# Patient Record
Sex: MALE | Race: WHITE | Employment: FULL TIME | URBAN - METROPOLITAN AREA
[De-identification: names, ages, dates, MRNs, and addresses within clinical notes are randomized per-mention and may not be internally consistent; named-entity substitution may affect disease eponyms.]

---

## 2017-07-24 ENCOUNTER — HOSPITAL ENCOUNTER (OUTPATIENT)
Age: 57
Discharge: HOME | End: 2017-07-24
Attending: EMERGENCY MEDICINE
Payer: COMMERCIAL

## 2017-07-24 VITALS
BODY MASS INDEX: 28 KG/M2 | TEMPERATURE: 99 F | DIASTOLIC BLOOD PRESSURE: 80 MMHG | SYSTOLIC BLOOD PRESSURE: 143 MMHG | RESPIRATION RATE: 16 BRPM | HEART RATE: 75 BPM | OXYGEN SATURATION: 97 % | WEIGHT: 175 LBS

## 2017-07-24 DIAGNOSIS — H81.10 VERTIGO, BENIGN POSITIONAL, UNSPECIFIED LATERALITY: ICD-10-CM

## 2017-07-24 DIAGNOSIS — J01.90 ACUTE NON-RECURRENT SINUSITIS, UNSPECIFIED LOCATION: Primary | ICD-10-CM

## 2017-07-24 LAB
ATRIAL RATE: 61 BPM
P AXIS: 29 DEGREES
P-R INTERVAL: 160 MS
Q-T INTERVAL: 390 MS
QRS DURATION: 110 MS
QTC CALCULATION (BEZET): 392 MS
R AXIS: -47 DEGREES
T AXIS: 2 DEGREES
VENTRICULAR RATE: 61 BPM

## 2017-07-24 PROCEDURE — 99214 OFFICE O/P EST MOD 30 MIN: CPT

## 2017-07-24 PROCEDURE — 93010 ELECTROCARDIOGRAM REPORT: CPT

## 2017-07-24 PROCEDURE — 93005 ELECTROCARDIOGRAM TRACING: CPT

## 2017-07-24 RX ORDER — METHYLPREDNISOLONE 4 MG/1
TABLET ORAL
Qty: 1 PACKAGE | Refills: 0 | Status: SHIPPED | OUTPATIENT
Start: 2017-07-24

## 2017-07-24 RX ORDER — OMEPRAZOLE 20 MG/1
20 CAPSULE, DELAYED RELEASE ORAL
COMMUNITY

## 2017-07-24 RX ORDER — AMOXICILLIN 875 MG/1
875 TABLET, COATED ORAL 2 TIMES DAILY
Qty: 20 TABLET | Refills: 0 | Status: SHIPPED | OUTPATIENT
Start: 2017-07-24 | End: 2017-08-03

## 2017-07-24 NOTE — ED PROVIDER NOTES
Patient presents with:  Dizziness (neurologic)    HPI:     Chantal Seen is a 60year old male who presents with chief complaint of dizziness, sinus congestion. Started with sinus congestion a little over a week ago.   2 days ago had a sinus headache, p clear bilaterally. Sinuses:  No overlying erythema nor swelling. Throat: lips, mucosa, and tongue normal.  Posterior oropharynx is not injected. No tonsillar hypertrophy, uvular edema or tonsillar exudate. Neck: supple, symmetrical, trachea midline.

## 2017-07-24 NOTE — ED INITIAL ASSESSMENT (HPI)
Yesterday states developed lightheadedness (feeling like room moving without him) - worse yesterday / neck stiffness / headaches / nasal congestion / maybe some ringing in ears this am / Took motion sickness medication this am around 9 am with some relief

## (undated) NOTE — LETTER
[facility] [place]  [id number] [id number]  Dept: [phone]  Dept Fax: [phone]         July 24, 2017    Patient: Jeanine Khan   YOB: 1960   Date of Visit: 7/24/2017       To Whom It May Concern:    Saul